# Patient Record
Sex: MALE | Race: WHITE | NOT HISPANIC OR LATINO | Employment: FULL TIME | URBAN - METROPOLITAN AREA
[De-identification: names, ages, dates, MRNs, and addresses within clinical notes are randomized per-mention and may not be internally consistent; named-entity substitution may affect disease eponyms.]

---

## 2024-06-29 PROCEDURE — 12001 RPR S/N/AX/GEN/TRNK 2.5CM/<: CPT

## 2024-06-29 PROCEDURE — 99283 EMERGENCY DEPT VISIT LOW MDM: CPT

## 2024-06-29 ASSESSMENT — COLUMBIA-SUICIDE SEVERITY RATING SCALE - C-SSRS
1. IN THE PAST MONTH, HAVE YOU WISHED YOU WERE DEAD OR WISHED YOU COULD GO TO SLEEP AND NOT WAKE UP?: NO
6. HAVE YOU EVER DONE ANYTHING, STARTED TO DO ANYTHING, OR PREPARED TO DO ANYTHING TO END YOUR LIFE?: NO
2. HAVE YOU ACTUALLY HAD ANY THOUGHTS OF KILLING YOURSELF IN THE PAST MONTH?: NO

## 2024-06-30 ENCOUNTER — HOSPITAL ENCOUNTER (EMERGENCY)
Facility: CLINIC | Age: 24
Discharge: HOME | End: 2024-06-30
Attending: EMERGENCY MEDICINE | Admitting: EMERGENCY MEDICINE
Payer: OTHER GOVERNMENT

## 2024-06-30 VITALS
OXYGEN SATURATION: 97 % | HEART RATE: 71 BPM | SYSTOLIC BLOOD PRESSURE: 134 MMHG | DIASTOLIC BLOOD PRESSURE: 72 MMHG | WEIGHT: 219.36 LBS | RESPIRATION RATE: 14 BRPM | TEMPERATURE: 98.4 F | BODY MASS INDEX: 29.07 KG/M2 | HEIGHT: 73 IN

## 2024-06-30 DIAGNOSIS — S61.411A LACERATION OF RIGHT HAND WITHOUT FOREIGN BODY, INITIAL ENCOUNTER: ICD-10-CM

## 2024-06-30 RX ORDER — LIDOCAINE HYDROCHLORIDE AND EPINEPHRINE 10; 10 MG/ML; UG/ML
INJECTION, SOLUTION INFILTRATION; PERINEURAL
Status: DISCONTINUED
Start: 2024-06-30 | End: 2024-06-30 | Stop reason: HOSPADM

## 2024-06-30 ASSESSMENT — ACTIVITIES OF DAILY LIVING (ADL)
ADLS_ACUITY_SCORE: 33
ADLS_ACUITY_SCORE: 33

## 2024-06-30 NOTE — ED PROVIDER NOTES
"  Emergency Department Note      History of Present Illness     Chief Complaint   Laceration      HPI   Ramón Meyer is a 24 year old male who reports to the emergency department for evaluation of a laceration. The patient reports that he cut his thumb on his right while attempting to cut a cigar. He states that the cutter was brand new. He believes his is up to date on his tetanus immunizations. Patient reports that he is right handed. He denies any trouble with function after the laceration.    Independent Historian   None    Review of External Notes   None    Past Medical History     Medical History and Problem List   Closed fracture of rib of left side  Hematuria    Medications   Anaprox    Surgical History   No past surgical history.    Physical Exam     Patient Vitals for the past 24 hrs:   BP Temp Temp src Pulse Resp SpO2 Height Weight   06/29/24 2218 (!) 143/85 98.4  F (36.9  C) Temporal 76 18 99 % 1.854 m (6' 1\") 99.5 kg (219 lb 5.7 oz)     Physical Exam  General: Adult sitting upright  MSK: No edema. Nontender. Normal active range of motion of the right thumb at the MCP and DIP joint with flex/ext/abd/add.  Skin: Warm and dry. 1.8cm laceration at the base of the the right thumb, subcutaneous, no active bleeding.   Neuro: Alert and oriented. Responds appropriately to all questions and commands. No focal findings appreciated. Normal muscle tone. Sensation intact to light touch over the right thumb.   Psych: Normal mood and affect. Pleasant.       ED Course      Medications Administered   Medications   lidocaine 1% with EPINEPHrine 1:100,000 1 %-1:217122 injection (has no administration in time range)       Procedures   Procedures     Laceration Repair      Procedure: Laceration Repair    Indication: Laceration    Consent: Verbal    Tetanus status reviewed    Location: Right Hand     Length: 1.8 cm    Preparation: Irrigation with Sterile Saline.    Anesthesia/Sedation: Lidocaine with Epinephrine - " 1%      Treatment/Exploration: Wound explored, no foreign bodies found     Closure: The wound was closed with one layer. Skin/superficial layer was closed with 3 x 5-0 Nylon using Interrupted sutures.     Patient Status: The patient tolerated the procedure well: Yes. There were no complications.      Discussion of Management   None    Social Determinants of Health adding to complexity of care   None    ED Course   ED Course as of 06/30/24 0154   Sun Jun 30, 2024   0109 I initially assessed the patient and obtained the above history and physical exam.     I performed laceration repair.  Patient tolerated this well.  Discharged home.    Medical Decision Making / Diagnosis     MDM   Ramón Meyer is a 24 year old male who presents for evaluation of a laceration to the right thumb.  The wound was carefully evaluated and explored.  The laceration was closed with suture as noted above.  There is no evidence of muscular, tendon, or bony damage with this laceration.  No signs of foreign body.  Possible complications (infection, scarring) were reviewed with the patient.  Follow up with primary care as noted in the discharge section.  All questions answered prior to discharge.  Tetanus status up-to-date.    Disposition   The patient was discharged.     Diagnosis     ICD-10-CM    1. Laceration of right hand without foreign body, initial encounter  S61.411A            Scribe Disclosure:  I, Daniel Mcnair, am serving as a scribe at 1:09 AM on 6/30/2024 to document services personally performed by Ese Gonzalez MD based on my observations and the provider's statements to me.        Ese Gonzalez MD  07/01/24 0684

## 2024-06-30 NOTE — ED TRIAGE NOTES
Pt presents to the ED with complaint of laceration to skin in between thumb and pointer finger on right hand. Pt states he sliced it on a cigar cutter. Bleeding controlled in triage.